# Patient Record
Sex: FEMALE | Race: ASIAN | Employment: FULL TIME | URBAN - METROPOLITAN AREA
[De-identification: names, ages, dates, MRNs, and addresses within clinical notes are randomized per-mention and may not be internally consistent; named-entity substitution may affect disease eponyms.]

---

## 2024-08-01 ENCOUNTER — APPOINTMENT (OUTPATIENT)
Dept: OCCUPATIONAL MEDICINE | Facility: CLINIC | Age: 44
End: 2024-08-01

## 2024-08-01 PROCEDURE — 99199 UNLISTED SPECIAL SVC PX/RPRT: CPT | Performed by: NURSE PRACTITIONER

## 2024-08-01 PROCEDURE — 99499 UNLISTED E&M SERVICE: CPT | Performed by: NURSE PRACTITIONER

## 2024-09-20 ENCOUNTER — OFFICE VISIT (OUTPATIENT)
Dept: FAMILY MEDICINE | Facility: CLINIC | Age: 44
End: 2024-09-20
Payer: COMMERCIAL

## 2024-09-20 VITALS
BODY MASS INDEX: 33.76 KG/M2 | TEMPERATURE: 97.6 F | WEIGHT: 178.8 LBS | HEART RATE: 88 BPM | RESPIRATION RATE: 16 BRPM | OXYGEN SATURATION: 97 % | DIASTOLIC BLOOD PRESSURE: 82 MMHG | HEIGHT: 61 IN | SYSTOLIC BLOOD PRESSURE: 123 MMHG

## 2024-09-20 DIAGNOSIS — H92.02 LEFT EAR PAIN: Primary | ICD-10-CM

## 2024-09-20 DIAGNOSIS — R21 RASH: ICD-10-CM

## 2024-09-20 DIAGNOSIS — H65.92 FLUID LEVEL BEHIND TYMPANIC MEMBRANE OF LEFT EAR: ICD-10-CM

## 2024-09-20 PROCEDURE — 99214 OFFICE O/P EST MOD 30 MIN: CPT | Performed by: NURSE PRACTITIONER

## 2024-09-20 RX ORDER — LORATADINE 10 MG/1
10 TABLET ORAL DAILY
COMMUNITY
Start: 2024-09-20 | End: 2024-09-27

## 2024-09-20 RX ORDER — TRIAMCINOLONE ACETONIDE 1 MG/G
OINTMENT TOPICAL 2 TIMES DAILY
Qty: 30 G | Refills: 0 | Status: SHIPPED | OUTPATIENT
Start: 2024-09-20

## 2024-09-20 RX ORDER — FLUTICASONE PROPIONATE 50 MCG
1 SPRAY, SUSPENSION (ML) NASAL 2 TIMES DAILY
COMMUNITY
Start: 2024-09-20 | End: 2024-09-25

## 2024-09-20 ASSESSMENT — PAIN SCALES - GENERAL: PAINLEVEL: SEVERE PAIN (7)

## 2024-09-20 NOTE — PROGRESS NOTES
"  Assessment & Plan     Left ear pain  Recent cold. Pain started 3 days ago. Denies fever or chills.     Fluid level behind tympanic membrane of left ear  - fluticasone (FLONASE) 50 MCG/ACT nasal spray; Spray 1 spray into both nostrils 2 times daily for 5 days.  - loratadine (CLARITIN) 10 MG tablet; Take 1 tablet (10 mg) by mouth daily for 7 days.    Rash  Posterior neck. Stop the Dreft laundery detertergent; Use Tide free or ALL dye free perfume free laundry detergent. Use Dove or Aveeno lotion or soap. Use Aquaphor, lubrider, cetaphil or eucerin lotion daily to two times a day as needed .  - triamcinolone (KENALOG) 0.1 % external ointment; Apply topically 2 times daily.    BMI  Estimated body mass index is 33.67 kg/m  as calculated from the following:    Height as of this encounter: 1.552 m (5' 1.1\").    Weight as of this encounter: 81.1 kg (178 lb 12.8 oz).             Subjective   See is a 44 year old, presenting for the following health issues:  Otitis Media      9/20/2024     3:02 PM   Additional Questions   Roomed by Unique   Accompanied by self     History of Present Illness       Reason for visit:  Ear ache and rash  Symptom onset:  1-3 days ago  Symptoms include:  L ear ache with pain radiating to temple and down neck,rash on scalp/neck  Symptom intensity:  Moderate  Symptom progression:  Worsening  Had these symptoms before:  No  What makes it worse:  Na  What makes it better:  Sleeping on affected ear, tylenol   She is taking medications regularly.                     Objective    /82 (BP Location: Left arm, Patient Position: Sitting, Cuff Size: Adult Regular)   Pulse 88   Temp 97.6  F (36.4  C) (Temporal)   Resp 16   Ht 1.552 m (5' 1.1\")   Wt 81.1 kg (178 lb 12.8 oz)   LMP 09/09/2024 (Exact Date)   SpO2 97%   BMI 33.67 kg/m    Body mass index is 33.67 kg/m .  Physical Exam   GENERAL: alert and no distress  HENT: right ear: middle ear effusion, no redness, left ear: middle ear effusion, no " redness, oropharynx clear, and oral mucous membranes moist  RESP: lungs clear to auscultation - no rales, rhonchi or wheezes  MS: no gross musculoskeletal defects noted, no edema  NEURO: Normal strength and tone, mentation intact and speech normal  PSYCH: mentation appears normal, affect normal/bright            Signed Electronically by: LISSETTE Guzmán CNP

## 2024-09-20 NOTE — PATIENT INSTRUCTIONS
At [facility], we strive to deliver an exceptional experience to you, every time we see you. If you receive a survey, please let us know what we are doing well and/or what we could improve upon, as we do value your feedback.  If you have MyChart, you can expect to receive results automatically within 24 hours of their completion.  Your provider will send a note interpreting your results as well.   If you do not have MyChart, you should receive your results in about a week by mail.    Your care team:                            Family Medicine Internal Medicine   MD Stef Coyne, MD Cleo Rosado, MD Dion Lin, MD Ann Alves, PAYanickC    Gunnar Amaro, MD Pediatrics   Kristi Conway, MD Martha Phillips, MD Yoko Suarez, APRN CNP Lilia Hall APRN CNP   MD Barbra Mcpherson, MD Aviva Doty, CNP     Nathaniel Dallas, CNP Same-Day Provider (No follow-up visits)   LISSETTE Guzmán, DNP Karen Moss, LISSETTE Mejía, FNP, BC SAM OsbornC     Clinic hours: Monday - Thursday 7 am-6 pm; Fridays 7 am-5 pm.   Urgent care: Monday - Friday 10 am- 8 pm; Saturday and Sunday 9 am-5 pm.    Clinic: [phone]       [facility]: Monday - Thursday 8 am - 7 pm; Friday 8 am - 6 pm  [facility]: [phone]

## 2024-10-05 ENCOUNTER — HEALTH MAINTENANCE LETTER (OUTPATIENT)
Age: 44
End: 2024-10-05

## 2024-10-25 ENCOUNTER — OFFICE VISIT (OUTPATIENT)
Dept: FAMILY MEDICINE | Facility: CLINIC | Age: 44
End: 2024-10-25
Payer: COMMERCIAL

## 2024-10-25 VITALS
BODY MASS INDEX: 33.79 KG/M2 | WEIGHT: 179 LBS | OXYGEN SATURATION: 100 % | TEMPERATURE: 98 F | RESPIRATION RATE: 18 BRPM | SYSTOLIC BLOOD PRESSURE: 134 MMHG | DIASTOLIC BLOOD PRESSURE: 76 MMHG | HEART RATE: 85 BPM | HEIGHT: 61 IN

## 2024-10-25 DIAGNOSIS — K04.7 TOOTH INFECTION: Primary | ICD-10-CM

## 2024-10-25 DIAGNOSIS — Q60.2 CONGENITAL ABSENCE OF KIDNEY: ICD-10-CM

## 2024-10-25 DIAGNOSIS — Z12.31 VISIT FOR SCREENING MAMMOGRAM: ICD-10-CM

## 2024-10-25 DIAGNOSIS — Z23 HIGH PRIORITY FOR 2019-NCOV VACCINE: ICD-10-CM

## 2024-10-25 DIAGNOSIS — R68.89 TEMPERATURE INTOLERANCE: ICD-10-CM

## 2024-10-25 DIAGNOSIS — Z13.6 CARDIOVASCULAR SCREENING; LDL GOAL LESS THAN 160: ICD-10-CM

## 2024-10-25 DIAGNOSIS — Z23 NEED FOR PROPHYLACTIC VACCINATION AND INOCULATION AGAINST INFLUENZA: ICD-10-CM

## 2024-10-25 DIAGNOSIS — E66.811 CLASS 1 OBESITY WITHOUT SERIOUS COMORBIDITY IN ADULT, UNSPECIFIED BMI, UNSPECIFIED OBESITY TYPE: ICD-10-CM

## 2024-10-25 DIAGNOSIS — E28.2 PCOS (POLYCYSTIC OVARIAN SYNDROME): ICD-10-CM

## 2024-10-25 PROCEDURE — 99214 OFFICE O/P EST MOD 30 MIN: CPT | Mod: 25 | Performed by: FAMILY MEDICINE

## 2024-10-25 PROCEDURE — 90480 ADMN SARSCOV2 VAC 1/ONLY CMP: CPT | Performed by: FAMILY MEDICINE

## 2024-10-25 PROCEDURE — 90471 IMMUNIZATION ADMIN: CPT | Performed by: FAMILY MEDICINE

## 2024-10-25 PROCEDURE — 90656 IIV3 VACC NO PRSV 0.5 ML IM: CPT | Performed by: FAMILY MEDICINE

## 2024-10-25 PROCEDURE — 91320 SARSCV2 VAC 30MCG TRS-SUC IM: CPT | Performed by: FAMILY MEDICINE

## 2024-10-25 ASSESSMENT — PAIN SCALES - GENERAL: PAINLEVEL_OUTOF10: SEVERE PAIN (6)

## 2024-10-25 NOTE — PROGRESS NOTES
"  Assessment & Plan     Visit for screening mammogram  - MA Screening Bilateral w/ Ernesto; Future    Tooth infection  Right lower molar tooth with swollen tender gums and bleeding  Has a dentist appointment next month unable to be seen sooner  - amoxicillin-clavulanate (AUGMENTIN) 875-125 MG tablet; Take 1 tablet by mouth 2 times daily for 7 days.    Class 1 obesity without serious comorbidity in adult, unspecified BMI, unspecified obesity type  Stable    Congenital absence of kidney  Labs for monitoring  - Comprehensive metabolic panel (BMP + Alb, Alk Phos, ALT, AST, Total. Bili, TP); Future    CARDIOVASCULAR SCREENING; LDL GOAL LESS THAN 160  - Lipid panel reflex to direct LDL Non-fasting; Future    Temperature intolerance  Potentially perimenopausal - symptoms include feeling very hot and cold at night  Not distressing yet  Labs as below  Perioids are regular  She has hx of pcos  - TSH with free T4 reflex; Future  - CBC with platelets; Future  - Ferritin; Future  - Iron and iron binding capacity; Future    Need for prophylactic vaccination and inoculation against influenza    High priority for 2019-nCoV vaccine      Follow up for pap smear    BMI  Estimated body mass index is 33.71 kg/m  as calculated from the following:    Height as of this encounter: 1.552 m (5' 1.1\").    Weight as of this encounter: 81.2 kg (179 lb).             Subjective   See is a 44 year old, presenting for the following health issues:  Establish Care and Dental Pain      10/25/2024    10:55 AM   Additional Questions   Roomed by Emilia HARPER CMA     Dental Pain    History of Present Illness       Reason for visit:  Establish PCP, toothache   She is taking medications regularly.    Recent transfer of care from [facility]      Annual visit  No pap smear    Right lower tooth ache pain may be due to infection. Plan to see dentist in November.   Pain is tolerable with IBU    Congenital 1 kidney  Heart shape uterus  stable      Mom - HTN  No family " "history of cancer, DM  No mole concern  Eczema - using triamcinolone cream  History of pre-eclampsia    Goal: established care            Pt has had  at toothache for 1 week. Pt cannot eat on her right side it is a shooting pain that travels into neck and up towards temple. It does bleed. Her pain is at a 6/10            Objective    Ht 1.552 m (5' 1.1\")   LMP 09/09/2024 (Exact Date)   BMI 33.67 kg/m    Body mass index is 33.67 kg/m .  Physical Exam   GENERAL: alert and no distress  NECK: no adenopathy, no asymmetry, masses, or scars  RESP: lungs clear to auscultation - no rales, rhonchi or wheezes  CV: regular rate and rhythm, normal S1 S2, no S3 or S4, no murmur, click or rub, no peripheral edema  ABDOMEN: soft, nontender, no hepatosplenomegaly, no masses and bowel sounds normal  MS: no gross musculoskeletal defects noted, no edema            Signed Electronically by: Zohra Gaston MD    "

## 2024-10-25 NOTE — NURSING NOTE
"No chief complaint on file.      Initial Ht 1.552 m (5' 1.1\")   LMP 09/09/2024 (Exact Date)   BMI 33.67 kg/m   Estimated body mass index is 33.67 kg/m  as calculated from the following:    Height as of this encounter: 1.552 m (5' 1.1\").    Weight as of 9/20/24: 81.1 kg (178 lb 12.8 oz).    Patient presents to the clinic using No DME    Is there anyone who you would like to be able to receive your results? No  If yes have patient fill out HAYLEY      "